# Patient Record
Sex: FEMALE | Race: WHITE | NOT HISPANIC OR LATINO | Employment: UNEMPLOYED | RURAL
[De-identification: names, ages, dates, MRNs, and addresses within clinical notes are randomized per-mention and may not be internally consistent; named-entity substitution may affect disease eponyms.]

---

## 2023-11-16 ENCOUNTER — OFFICE VISIT (OUTPATIENT)
Dept: FAMILY MEDICINE | Facility: CLINIC | Age: 53
End: 2023-11-16

## 2023-11-16 VITALS
WEIGHT: 156.63 LBS | RESPIRATION RATE: 16 BRPM | DIASTOLIC BLOOD PRESSURE: 83 MMHG | OXYGEN SATURATION: 98 % | HEIGHT: 63 IN | HEART RATE: 79 BPM | SYSTOLIC BLOOD PRESSURE: 132 MMHG | TEMPERATURE: 98 F | BODY MASS INDEX: 27.75 KG/M2

## 2023-11-16 DIAGNOSIS — K05.219 GINGIVAL ABSCESS: Primary | ICD-10-CM

## 2023-11-16 PROCEDURE — 99203 PR OFFICE/OUTPT VISIT, NEW, LEVL III, 30-44 MIN: ICD-10-PCS | Mod: GC,,, | Performed by: SPECIALIST

## 2023-11-16 PROCEDURE — 99203 OFFICE O/P NEW LOW 30 MIN: CPT | Mod: GC,,, | Performed by: SPECIALIST

## 2023-11-16 RX ORDER — CIPROFLOXACIN 500 MG/1
500 TABLET ORAL EVERY 12 HOURS
Qty: 14 TABLET | Refills: 0 | Status: SHIPPED | OUTPATIENT
Start: 2023-11-16 | End: 2023-11-23

## 2023-11-16 NOTE — PROGRESS NOTES
Subjective:       Patient ID: Rosibel Pandey is a 53 y.o. female.    Chief Complaint: Chills (S/S STARTED MONDAY. PT REFUSES TO BE SWABBED), Cough, and RIGHT EAR PAIN    Patient is a 54 y/o female who presents to the clinic with complaint of cough and right ear pain. Patient reports productive cough that started 3 days ago associated with subjective fever. Patient denies nasal congestion or rhinorrhea. Patient also reports left upper gingiva draining pus for the last few months. Patient reports not having insurance and not seeing a dentist or a doctor in years. Patient reports having chronic right ear pain for years due to trigeminal neuralgia. She denies fever, chills, nausea, vomiting, SOB, chest pain, bowel or urinary habit changes.          Current Outpatient Medications:     ciprofloxacin HCl (CIPRO) 500 MG tablet, Take 1 tablet (500 mg total) by mouth every 12 (twelve) hours. for 7 days, Disp: 14 tablet, Rfl: 0    Review of patient's allergies indicates:   Allergen Reactions    Eggs [egg derived]     Pcn [penicillins]        Past Medical History:   Diagnosis Date    Celiac disease        Past Surgical History:   Procedure Laterality Date    HYSTERECTOMY  1996    PARTIAL RIGHT       Family History   Problem Relation Age of Onset    Heart disease Mother     Cancer Father     Heart attack Father     Thyroid disease Sister     Cancer Maternal Aunt     Diabetes Paternal Uncle     Heart disease Paternal Uncle     Stroke Paternal Uncle     Cancer Maternal Grandmother     Vision loss Maternal Grandfather     Heart attack Paternal Grandfather     Diabetes Paternal Grandfather        Social History     Tobacco Use    Smoking status: Every Day     Types: Cigarettes    Smokeless tobacco: Never   Substance Use Topics    Alcohol use: Not Currently    Drug use: Never       Review of Systems   Constitutional:  Negative for activity change, appetite change, chills, fatigue and fever.   HENT:  Positive for ear pain. Negative for  "nasal congestion, ear discharge, hearing loss, mouth sores, rhinorrhea, sinus pressure/congestion, sneezing, sore throat and trouble swallowing.    Eyes:  Negative for pain, discharge, redness, itching and visual disturbance.   Respiratory:  Positive for cough. Negative for shortness of breath, wheezing and stridor.    Cardiovascular:  Negative for chest pain, palpitations and leg swelling.   Gastrointestinal:  Negative for abdominal distention, abdominal pain, blood in stool, change in bowel habit, constipation, diarrhea, nausea and vomiting.   Endocrine: Negative for polydipsia, polyphagia and polyuria.   Genitourinary:  Negative for decreased urine volume, difficulty urinating, dysuria, flank pain, frequency, hematuria and urgency.   Musculoskeletal:  Negative for arthralgias, leg pain, myalgias, neck pain and neck stiffness.   Integumentary:  Negative for color change, pallor, rash and wound.   Neurological:  Negative for dizziness, vertigo, tremors, seizures, speech difficulty, weakness, light-headedness, numbness and headaches.   Psychiatric/Behavioral:  Negative for behavioral problems, confusion, decreased concentration and sleep disturbance. The patient is not nervous/anxious.          Current Medications:            Objective:        Vitals:    11/16/23 1533   BP: 132/83   BP Location: Right arm   Patient Position: Sitting   BP Method: Medium (Automatic)   Pulse: 79   Resp: 16   Temp: 97.9 °F (36.6 °C)   TempSrc: Oral   SpO2: 98%   Weight: 71 kg (156 lb 9.6 oz)   Height: 5' 3" (1.6 m)       Physical Exam  Vitals and nursing note reviewed.   Constitutional:       General: She is not in acute distress.     Appearance: Normal appearance. She is not ill-appearing, toxic-appearing or diaphoretic.   HENT:      Head: Normocephalic and atraumatic.      Right Ear: Tympanic membrane, ear canal and external ear normal. There is no impacted cerumen.      Left Ear: Tympanic membrane, ear canal and external ear normal. " "There is no impacted cerumen.      Nose: Nose normal.      Mouth/Throat:      Mouth: Mucous membranes are dry.      Pharynx: Oropharynx is clear. No oropharyngeal exudate or posterior oropharyngeal erythema.      Comments: Poor dentition. Left upper gingival abscess draining purulent content  Eyes:      General: No scleral icterus.     Extraocular Movements: Extraocular movements intact.      Conjunctiva/sclera: Conjunctivae normal.      Pupils: Pupils are equal, round, and reactive to light.   Cardiovascular:      Rate and Rhythm: Normal rate and regular rhythm.      Pulses: Normal pulses.      Heart sounds: Normal heart sounds. No murmur heard.  Pulmonary:      Effort: Pulmonary effort is normal. No respiratory distress.      Breath sounds: Normal breath sounds. No wheezing, rhonchi or rales.   Abdominal:      General: Bowel sounds are normal. There is no distension.      Tenderness: There is no abdominal tenderness. There is no right CVA tenderness, left CVA tenderness, guarding or rebound.   Musculoskeletal:         General: Normal range of motion.      Cervical back: Normal range of motion and neck supple.      Right lower leg: No edema.      Left lower leg: No edema.   Skin:     General: Skin is warm and dry.      Capillary Refill: Capillary refill takes less than 2 seconds.      Findings: No rash.   Neurological:      General: No focal deficit present.      Mental Status: She is alert and oriented to person, place, and time.      Cranial Nerves: No cranial nerve deficit.      Sensory: No sensory deficit.      Motor: No weakness.   Psychiatric:         Mood and Affect: Mood normal.         Behavior: Behavior normal.         Thought Content: Thought content normal.               No results found for: "WBC", "HGB", "HCT", "PLT", "CHOL", "TRIG", "HDL", "LDLDIRECT", "ALT", "AST", "NA", "K", "CL", "CREATININE", "BUN", "CO2", "TSH", "PSA", "INR", "GLUF", "HGBA1C", "MICROALBUR"   Assessment:       1. Gingival abscess "        Plan:         Problem List Items Addressed This Visit          ENT    Gingival abscess - Primary     Patient with poor dentition and gingival abscess draining purulent discharge  Start ciprofloxacin 500mg BID since she has allergy to penicillin, OTC ibuprofen and gurgling with salt water.  Patient reports not having insurance. Recommend scheduling dentist appointment at [facility] where she can have free dental assistance. Patient also recommended to signs up for free medical assistance at the [facility] since she has no PCP and has not had a regular visit in years. Patient verbalized understanding.  RTC in 1 week or sooner in case no improvement or worsening of symptoms         Relevant Medications    ciprofloxacin HCl (CIPRO) 500 MG tablet         Follow up in about 1 week (around 11/23/2023), or if symptoms worsen or fail to improve.    Dominic Gardiner MD     Instructed patient that if symptoms fail to improve or worsen patient should seek immediate medical attention or report to the nearest emergency department. Patient expressed verbal agreement and understanding to this plan of care.

## 2023-11-16 NOTE — ASSESSMENT & PLAN NOTE
Patient with poor dentition and gingival abscess draining purulent discharge  Start ciprofloxacin 500mg BID since she has allergy to penicillin, OTC ibuprofen and gurgling with salt water.  Patient reports not having insurance. Recommend scheduling dentist appointment at [facility] where she can have free dental assistance. Patient also recommended to signs up for free medical assistance at the [facility] since she has no PCP and has not had a regular visit in years. Patient verbalized understanding.  RTC in 1 week or sooner in case no improvement or worsening of symptoms